# Patient Record
Sex: MALE | Race: WHITE | NOT HISPANIC OR LATINO | Employment: OTHER | URBAN - METROPOLITAN AREA
[De-identification: names, ages, dates, MRNs, and addresses within clinical notes are randomized per-mention and may not be internally consistent; named-entity substitution may affect disease eponyms.]

---

## 2024-04-11 ENCOUNTER — HOSPITAL ENCOUNTER (OUTPATIENT)
Dept: RADIOLOGY | Facility: CLINIC | Age: 69
Discharge: HOME | End: 2024-04-11
Payer: COMMERCIAL

## 2024-04-11 ENCOUNTER — OFFICE VISIT (OUTPATIENT)
Dept: ORTHOPEDIC SURGERY | Facility: CLINIC | Age: 69
End: 2024-04-11
Payer: COMMERCIAL

## 2024-04-11 VITALS — HEIGHT: 68 IN | BODY MASS INDEX: 25.76 KG/M2 | WEIGHT: 170 LBS

## 2024-04-11 DIAGNOSIS — M25.561 RIGHT KNEE PAIN, UNSPECIFIED CHRONICITY: ICD-10-CM

## 2024-04-11 DIAGNOSIS — M17.11 PRIMARY OSTEOARTHRITIS OF RIGHT KNEE: Primary | ICD-10-CM

## 2024-04-11 PROCEDURE — 1036F TOBACCO NON-USER: CPT | Performed by: ORTHOPAEDIC SURGERY

## 2024-04-11 PROCEDURE — 1159F MED LIST DOCD IN RCRD: CPT | Performed by: ORTHOPAEDIC SURGERY

## 2024-04-11 PROCEDURE — 20610 DRAIN/INJ JOINT/BURSA W/O US: CPT | Performed by: ORTHOPAEDIC SURGERY

## 2024-04-11 PROCEDURE — 99204 OFFICE O/P NEW MOD 45 MIN: CPT | Performed by: ORTHOPAEDIC SURGERY

## 2024-04-11 PROCEDURE — 73562 X-RAY EXAM OF KNEE 3: CPT | Mod: RIGHT SIDE | Performed by: RADIOLOGY

## 2024-04-11 PROCEDURE — 73562 X-RAY EXAM OF KNEE 3: CPT | Mod: RT

## 2024-04-11 PROCEDURE — 1125F AMNT PAIN NOTED PAIN PRSNT: CPT | Performed by: ORTHOPAEDIC SURGERY

## 2024-04-11 RX ORDER — METHYLPREDNISOLONE ACETATE 40 MG/ML
40 INJECTION, SUSPENSION INTRA-ARTICULAR; INTRALESIONAL; INTRAMUSCULAR; SOFT TISSUE
Status: COMPLETED | OUTPATIENT
Start: 2024-04-11 | End: 2024-04-11

## 2024-04-11 RX ORDER — LIDOCAINE HYDROCHLORIDE 20 MG/ML
2 INJECTION, SOLUTION INFILTRATION; PERINEURAL
Status: COMPLETED | OUTPATIENT
Start: 2024-04-11 | End: 2024-04-11

## 2024-04-11 RX ADMIN — METHYLPREDNISOLONE ACETATE 40 MG: 40 INJECTION, SUSPENSION INTRA-ARTICULAR; INTRALESIONAL; INTRAMUSCULAR; SOFT TISSUE at 10:30

## 2024-04-11 RX ADMIN — LIDOCAINE HYDROCHLORIDE 2 ML: 20 INJECTION, SOLUTION INFILTRATION; PERINEURAL at 10:30

## 2024-04-11 ASSESSMENT — PAIN - FUNCTIONAL ASSESSMENT: PAIN_FUNCTIONAL_ASSESSMENT: 0-10

## 2024-04-11 ASSESSMENT — PAIN DESCRIPTION - DESCRIPTORS: DESCRIPTORS: ACHING

## 2024-04-11 ASSESSMENT — PAIN SCALES - GENERAL: PAINLEVEL_OUTOF10: 6

## 2024-04-11 NOTE — PROGRESS NOTES
Chief complaint is right knee pain.  He had a remote open meniscectomy he has discomfort with activity he wears a knee brace he has not had injections in the past he takes meloxicam  He is very active works out 6 days a week and enjoys golf he walks [place] the golf [place]    Past medical,family and social histories have been reviewed and are up to date.  All other body systems have been reviewed and are negative for complaint.  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 Consent was given by the patient. Immediately prior to procedure a time out was called to verify the correct patient, procedure, equipment, support staff and site/side marked as required. Patient was prepped and draped in the usual sterile fashion.

## 2024-12-10 ENCOUNTER — APPOINTMENT (OUTPATIENT)
Dept: NEUROSURGERY | Facility: CLINIC | Age: 69
End: 2024-12-10
Payer: COMMERCIAL

## 2025-02-24 ENCOUNTER — APPOINTMENT (OUTPATIENT)
Dept: CARDIOLOGY | Facility: CLINIC | Age: 70
End: 2025-02-24
Payer: COMMERCIAL

## 2025-03-03 ENCOUNTER — APPOINTMENT (OUTPATIENT)
Dept: CARDIOLOGY | Facility: CLINIC | Age: 70
End: 2025-03-03
Payer: COMMERCIAL

## 2025-03-03 VITALS
HEIGHT: 68 IN | DIASTOLIC BLOOD PRESSURE: 78 MMHG | BODY MASS INDEX: 28.79 KG/M2 | WEIGHT: 190 LBS | SYSTOLIC BLOOD PRESSURE: 114 MMHG | HEART RATE: 59 BPM

## 2025-03-03 DIAGNOSIS — R93.1 ELEVATED CORONARY ARTERY CALCIUM SCORE: ICD-10-CM

## 2025-03-03 DIAGNOSIS — E78.5 DYSLIPIDEMIA: Primary | ICD-10-CM

## 2025-03-03 PROCEDURE — 99203 OFFICE O/P NEW LOW 30 MIN: CPT | Performed by: INTERNAL MEDICINE

## 2025-03-03 PROCEDURE — 1159F MED LIST DOCD IN RCRD: CPT | Performed by: INTERNAL MEDICINE

## 2025-03-03 PROCEDURE — 1036F TOBACCO NON-USER: CPT | Performed by: INTERNAL MEDICINE

## 2025-03-03 PROCEDURE — 93000 ELECTROCARDIOGRAM COMPLETE: CPT | Performed by: INTERNAL MEDICINE

## 2025-03-03 PROCEDURE — 3008F BODY MASS INDEX DOCD: CPT | Performed by: INTERNAL MEDICINE

## 2025-03-03 PROCEDURE — 1160F RVW MEDS BY RX/DR IN RCRD: CPT | Performed by: INTERNAL MEDICINE

## 2025-03-03 RX ORDER — OMEGA-3S/DHA/EPA/FISH OIL 300-1000MG
1 CAPSULE ORAL
COMMUNITY

## 2025-03-03 RX ORDER — FINASTERIDE 1 MG/1
1 TABLET, FILM COATED ORAL DAILY
COMMUNITY
Start: 2014-08-19

## 2025-03-03 RX ORDER — CHOLECALCIFEROL (VITAMIN D3) 25 MCG
1000 TABLET ORAL DAILY
COMMUNITY

## 2025-03-03 RX ORDER — TRETINOIN 0.5 MG/G
CREAM TOPICAL
COMMUNITY
Start: 2025-02-11

## 2025-03-03 RX ORDER — MULTIVITAMIN
1 TABLET ORAL
COMMUNITY

## 2025-03-03 RX ORDER — MELOXICAM 15 MG/1
15 TABLET ORAL
COMMUNITY
Start: 2024-04-15

## 2025-03-03 NOTE — PROGRESS NOTES
"Referred by Dr. Cedeño ref. provider found for Please see below.     History Of Present Illness:    Archie Cantrell \"Shivani\" is a 69 y.o. male presenting with elevated coronary artery calcium score, hyperlipidemia.    I am seeing this 69-year-old hyperlipidemic, occasional cigar smoker at his request because of a history of an elevated coronary artery calcium score.    His primary care physician is Dr. Malone.  He recalls seeing a cardiologist at the [facility] about 18 months ago because of cardiac risk factors.  Rosuvastatin was prescribed, but \"I am not crazy about taking a statin\".  For this reason, he sought evaluation with me for further input..  The patient denies chest discomfort, dyspnea, palpitations, orthopnea, PND, syncope, and near syncope.  He exercises 5-6 days a week, combining strength straining twice a week, and does cardio training on an elliptical  or does interval traing on  smart treadmill.  He feels well when he exercises.    There has been no change in the patient's functional capacity in the past year.    EtOH: 2 drinks per week;   Caffeine: 3 coffees per day.      Past Medical History:  He has no past medical history on file.    Past Surgical History:  He has no past surgical history on file.      Social History:  He reports that he has never smoked. He has never used smokeless tobacco. He reports current alcohol use of about 1.0 standard drink of alcohol per week. He reports that he does not use drugs.    Family History:  Family History   Problem Relation Name Age of Onset    Other (heart issues) Mother      No Known Problems Father          Allergies:  Patient has no known allergies.    Outpatient Medications:  Current Outpatient Medications   Medication Instructions    cholecalciferol (VITAMIN D3) 1,000 Units, oral, Daily    finasteride (Propecia) 1 mg tablet 1 tablet, Daily    meloxicam (MOBIC) 15 mg, Daily RT    multivitamin tablet 1 tablet, Daily RT    omega-3s-dha-epa-fish oil " "(Omega-3 Fish OiL) 300-1,000 mg capsule 1 capsule, Daily RT    tretinoin (Retin-A) 0.05 % cream APPLY PEA SIZE AMOUNT TO FACE AT BEDTIME    ZINC ORAL 1 tablet, Daily        Last Recorded Vitals:  Vitals:    03/03/25 0900   BP: 114/78   BP Location: Left arm   Patient Position: Sitting   Pulse: 59   Weight: 86.2 kg (190 lb)   Height: 1.727 m (5' 8\")       Physical Exam:  CHEST: Clear to auscultation  CARDIAC: Regular rhythm and rate, normal S1 and S2, no murmur rub or gallop; carotids are brisk without bruits, PMI is not displaced  ABDOMEN: Active bowel sounds, no tenderness, no evidence of ascites  EXTREMITIES: No clubbing, cyanosis, edema, or tenderness         Last Labs:  CBC -  No results found for: \"WBC\", \"HGB\", \"HCT\", \"MCV\", \"PLT\"    CMP -  No results found for: \"CALCIUM\", \"PHOS\", \"PROT\", \"ALBUMIN\", \"AST\", \"ALT\", \"ALKPHOS\", \"BILITOT\"    LIPID PANEL -   No results found for: \"CHOL\", \"TRIG\", \"HDL\", \"CHHDL\", \"LDLF\", \"VLDL\", \"NHDL\"    RENAL FUNCTION PANEL -   No results found for: \"GLUCOSE\", \"NA\", \"K\", \"CL\", \"CO2\", \"ANIONGAP\", \"BUN\", \"CREATININE\", \"GFRMALE\", \"CALCIUM\", \"PHOS\", \"ALBUMIN\"     No results found for: \"BNP\", \"HGBA1C\"      Imaging review: I have  personally reviewed the result(s) coronary artery calcium score March 2021 was 188.    Assessment/Plan   Assessment & Plan  Elevated coronary artery calcium score  He had a lipid profile done a few months ago, to his recollection, at [facility].  These test results are not available for my review.  He will try to obtain results of these tests for us.  If they are not available, or have not been done in the past year, they should be repeated.    At his next appointment we will assess his projected 10-year event rate according to NEGRETE with calcium score and provide targeted recommendations.   Orders:    ECG 12 lead (Clinic Performed)    Lipid panel; Future    Follow Up In Cardiology; Future    Dyslipidemia  Risk factor modification: educational " materials were provided to the patient.     Orders:    Lipid panel; Future    Follow Up In Cardiology; Future            Duke Rizvi MD

## 2025-03-03 NOTE — ASSESSMENT & PLAN NOTE
He had a lipid profile done a few months ago, to his recollection, at [facility].  These test results are not available for my review.  He will try to obtain results of these tests for us.  If they are not available, or have not been done in the past year, they should be repeated.    At his next appointment we will assess his projected 10-year event rate according to NEGRETE with calcium score and provide targeted recommendations.   Orders:    ECG 12 lead (Clinic Performed)    Lipid panel; Future    Follow Up In Cardiology; Future

## 2025-03-03 NOTE — ASSESSMENT & PLAN NOTE
Risk factor modification: educational materials were provided to the patient.     Orders:    Lipid panel; Future    Follow Up In Cardiology; Future

## 2025-03-03 NOTE — PATIENT INSTRUCTIONS
"It was my pleasure to meet you.  I look forward to being your cardiologist.  I am a huge believer in communicating with my patients.  Please contact me at any time, if anything is not clear to you regarding anything we have discussed, or if new questions occur to you.     You should increase your intake of fresh fruits and vegetables.  Try to consume 9-12 servings per day of such foods.  You should increase your intake of deep sea fish such as salmon and tuna.  Try to get two servings per week of fish, but if you are a pregnant woman, talk to your obstetrician before increasing your fish intake.  You should increase your intake of unprocessed nuts such as walnuts or almonds.  Increase your intake of plant-based protein.  You should avoid fried foods.  Don't consume sugary or starchy foods and sugary drinks.  Avoid saturated fats.  Try not to dine at restaurants more than once per month, and don't dine at fast food places.  Try to get 7-9 hours of sleep every night.  Try to get 150 minutes per week of moderate intensity exercise (after I have cleared you to start an exercise program).  Try to maintain the appropriate weight for your height based on body mass index (BMI). Maintain your cholesterol, blood sugar, and blood pressure in the recommended respective normal ranges.  There is a wealth of information on the American Heart Association's website regarding this.  Just Google \"Life's Essential 8\" for more information.   Ask me about any of these details  if you have questions.    As your cardiologist, I will be available to you at any time to answer any question you have concerning your heart health.  My staff, [facility] can also answer any questions you may have.  Best of luck.       It is important for us to have an accurate list of the medications, supplements, and their doses.  It is also important for us to have an accurate list of your allergies.  Please bring this information to every appointment.  " This is a vital part of the quality of care you receive through all of your providers.

## 2025-03-19 ENCOUNTER — APPOINTMENT (OUTPATIENT)
Dept: CARDIOLOGY | Facility: CLINIC | Age: 70
End: 2025-03-19
Payer: COMMERCIAL

## 2025-03-19 VITALS
SYSTOLIC BLOOD PRESSURE: 102 MMHG | WEIGHT: 187 LBS | BODY MASS INDEX: 28.34 KG/M2 | DIASTOLIC BLOOD PRESSURE: 70 MMHG | HEART RATE: 96 BPM | HEIGHT: 68 IN

## 2025-03-19 DIAGNOSIS — E78.5 DYSLIPIDEMIA: Primary | ICD-10-CM

## 2025-03-19 DIAGNOSIS — Z72.0 TOBACCO ABUSE: ICD-10-CM

## 2025-03-19 PROCEDURE — 99213 OFFICE O/P EST LOW 20 MIN: CPT | Performed by: INTERNAL MEDICINE

## 2025-03-19 PROCEDURE — 1159F MED LIST DOCD IN RCRD: CPT | Performed by: INTERNAL MEDICINE

## 2025-03-19 PROCEDURE — 3008F BODY MASS INDEX DOCD: CPT | Performed by: INTERNAL MEDICINE

## 2025-03-19 PROCEDURE — 1036F TOBACCO NON-USER: CPT | Performed by: INTERNAL MEDICINE

## 2025-03-19 NOTE — PROGRESS NOTES
"Chief Complaint:   Please see below.     History Of Present Illness:    Archie Cantrell \"Shivani\" is a 69 y.o. male presenting with dyslipidemia, elevated coronary artery calcium score.    Please see my prior note dated March 3, 2025 for complete details.  This hyperlipidemic occasional cigar smoker returns to the office in follow-up because of an elevated coronary artery calcium score.  He is extremely active for age as summarized in my previous note and functionally class I.  The patient's projected 10 year event rate according to NEGRETE with calcium score is 12.5%..  We went through the analysis again, factoring in identical data except switching to non-smoker, and his risk for an event over 10 years falls to 8.8%.       Last Recorded Vitals:  Vitals:    03/19/25 1500   BP: 102/70   BP Location: Left arm   Patient Position: Sitting   Pulse: 96   Weight: 84.8 kg (187 lb)   Height: 1.727 m (5' 8\")       Past Medical History:  He has no past medical history on file.    Past Surgical History:  He has no past surgical history on file.      Social History:  He reports that he has never smoked. He has never used smokeless tobacco. He reports current alcohol use of about 1.0 standard drink of alcohol per week. He reports that he does not use drugs.    Family History:  Family History   Problem Relation Name Age of Onset    Other (heart issues) Mother      No Known Problems Father          Allergies:  Patient has no known allergies.    Outpatient Medications:  Current Outpatient Medications   Medication Instructions    cholecalciferol (VITAMIN D3) 1,000 Units, Daily    finasteride (Propecia) 1 mg tablet 1 tablet, Daily    meloxicam (MOBIC) 15 mg, Daily RT    multivitamin tablet 1 tablet, Daily RT    omega-3s-dha-epa-fish oil (Omega-3 Fish OiL) 300-1,000 mg capsule 1 capsule, Daily RT    tretinoin (Retin-A) 0.05 % cream APPLY PEA SIZE AMOUNT TO FACE AT BEDTIME    ZINC ORAL 1 tablet, Daily       Physical Exam:  CHEST: Clear to " "auscultation  CARDIAC: Regular rhythm and rate, normal S1 and S2, no murmur rub or gallop; carotids are brisk without bruits, PMI is not displaced  ABDOMEN: Active bowel sounds, no tenderness, no evidence of ascites  EXTREMITIES: No clubbing, cyanosis, edema, or tenderness       Last Labs:  CBC -  No results found for: \"WBC\", \"HGB\", \"HCT\", \"MCV\", \"PLT\"    CMP -  No results found for: \"CALCIUM\", \"PHOS\", \"PROT\", \"ALBUMIN\", \"AST\", \"ALT\", \"ALKPHOS\", \"BILITOT\"    LIPID PANEL -   No results found for: \"CHOL\", \"TRIG\", \"HDL\", \"CHHDL\", \"LDLF\", \"VLDL\", \"NHDL\"    RENAL FUNCTION PANEL -   No results found for: \"GLUCOSE\", \"NA\", \"K\", \"CL\", \"CO2\", \"ANIONGAP\", \"BUN\", \"CREATININE\", \"GFRMALE\", \"CALCIUM\", \"PHOS\", \"ALBUMIN\"     No results found for: \"BNP\", \"HGBA1C\"      Lab review: I have reviewed his lipid profile and comprehensive metabolic profile.  Diagnostic review: I have personally reviewed the result(s) of the coronary artery calcium score.  Please see the report in HPI for complete details.    Assessment/Plan   Assessment & Plan  Dyslipidemia  In a patient centric manner, we discussed indications, risks, benefits, alternatives to statin pharmacotherapy for primary prevention.  He understands and wishes to approach to this with lifestyle modification rather than pharmacotherapy.       Tobacco abuse  Above all else, I advised the patient to quit tobacco, or else risk certain future cardiovascular morbidity, and/or mortality.               Duke Rizvi MD  "

## 2025-03-19 NOTE — ASSESSMENT & PLAN NOTE
In a patient centric manner, we discussed indications, risks, benefits, alternatives to statin pharmacotherapy for primary prevention.  He understands and wishes to approach to this with lifestyle modification rather than pharmacotherapy.

## 2025-04-28 ENCOUNTER — APPOINTMENT (OUTPATIENT)
Dept: CARDIOLOGY | Facility: CLINIC | Age: 70
End: 2025-04-28
Payer: COMMERCIAL